# Patient Record
Sex: MALE | Race: WHITE | NOT HISPANIC OR LATINO
[De-identification: names, ages, dates, MRNs, and addresses within clinical notes are randomized per-mention and may not be internally consistent; named-entity substitution may affect disease eponyms.]

---

## 2017-02-01 ENCOUNTER — RX RENEWAL (OUTPATIENT)
Age: 72
End: 2017-02-01

## 2017-06-03 ENCOUNTER — EMERGENCY (EMERGENCY)
Facility: HOSPITAL | Age: 72
LOS: 1 days | Discharge: HOME | End: 2017-06-03
Payer: MEDICARE

## 2017-06-03 VITALS
RESPIRATION RATE: 18 BRPM | WEIGHT: 190.04 LBS | DIASTOLIC BLOOD PRESSURE: 79 MMHG | SYSTOLIC BLOOD PRESSURE: 145 MMHG | OXYGEN SATURATION: 96 % | TEMPERATURE: 98 F | HEART RATE: 86 BPM | HEIGHT: 70 IN

## 2017-06-03 PROCEDURE — 99283 EMERGENCY DEPT VISIT LOW MDM: CPT

## 2017-06-03 PROCEDURE — 99283 EMERGENCY DEPT VISIT LOW MDM: CPT | Mod: 25

## 2017-06-03 RX ADMIN — Medication 200 MILLIGRAM(S): at 20:31

## 2017-06-03 NOTE — ED PROVIDER NOTE - NS ED MD SCRIBE ATTENDING SCRIBE SECTIONS
REVIEW OF SYSTEMS/HISTORY OF PRESENT ILLNESS/PHYSICAL EXAM/PAST MEDICAL/SURGICAL/SOCIAL HISTORY/DISPOSITION/VITAL SIGNS( Pullset)

## 2017-06-03 NOTE — ED PROVIDER NOTE - MEDICAL DECISION MAKING DETAILS
engorged wood tick removed easily intact form right axilla with splinter forcep. Wound site cleansed with alcohol and bacitracin applied. Doxycyline 200mg given for prophylaxis.

## 2017-06-03 NOTE — ED PROVIDER NOTE - DETAILS:
Ministerio Nunes MD - The scribe's documentation has been prepared under my direction and personally reviewed by me in its entirety. I confirm that the note above accurately reflects all work, treatment, procedures, and medical decision making performed by me.

## 2017-06-03 NOTE — ED PROVIDER NOTE - OBJECTIVE STATEMENT
70 y/o M pt with history of bilateral knee and hip replacement, appendectomy, tonsillectomy, cardiac stent presents to the ED c/o tick bite to right axilla. Pt states he first noticed it this morning, but is unsure when he it happened. Pt states he also pulled something small out of his left forearm earlier and is unsure if that was a tick too or not. Pt is on Plavix, Sertraline, Crestor, Famotidine, aspirin. Unknown last Tetanus. Denies fever, numbness. No further complaints at this time.   PCP: Dr. Chin 72 y/o M pt with history of bilateral knee and hip replacement, appendectomy, tonsillectomy, cardiac stent presents to the ED c/o tick bite to right axilla. Pt states he first noticed it this morning, but is unsure when he it happened. Pt states he also pulled a tick out of his left arm earlier today. Pt is on Plavix, Sertraline, Crestor, Famotidine, aspirin. Unknown last Tetanus. Denies fever, numbness. No further complaints at this time.   PCP: Dr. Chin

## 2018-01-10 ENCOUNTER — RX RENEWAL (OUTPATIENT)
Age: 73
End: 2018-01-10

## 2018-09-26 ENCOUNTER — RX RENEWAL (OUTPATIENT)
Age: 73
End: 2018-09-26

## 2019-04-10 ENCOUNTER — TRANSCRIPTION ENCOUNTER (OUTPATIENT)
Age: 74
End: 2019-04-10

## 2019-06-06 ENCOUNTER — RX RENEWAL (OUTPATIENT)
Age: 74
End: 2019-06-06

## 2020-02-03 ENCOUNTER — RX RENEWAL (OUTPATIENT)
Age: 75
End: 2020-02-03

## 2020-09-06 ENCOUNTER — TRANSCRIPTION ENCOUNTER (OUTPATIENT)
Age: 75
End: 2020-09-06

## 2020-10-12 ENCOUNTER — RX RENEWAL (OUTPATIENT)
Age: 75
End: 2020-10-12

## 2021-01-27 DIAGNOSIS — K92.1 MELENA: ICD-10-CM

## 2021-01-28 LAB
BASOPHILS # BLD AUTO: 0.06 K/UL
BASOPHILS NFR BLD AUTO: 0.8 %
EOSINOPHIL # BLD AUTO: 0.12 K/UL
EOSINOPHIL NFR BLD AUTO: 1.6 %
HCT VFR BLD CALC: 48.4 %
HGB BLD-MCNC: 15.8 G/DL
IMM GRANULOCYTES NFR BLD AUTO: 0.3 %
LYMPHOCYTES # BLD AUTO: 1.92 K/UL
LYMPHOCYTES NFR BLD AUTO: 25.7 %
MAN DIFF?: NORMAL
MCHC RBC-ENTMCNC: 31.3 PG
MCHC RBC-ENTMCNC: 32.6 GM/DL
MCV RBC AUTO: 95.8 FL
MONOCYTES # BLD AUTO: 0.78 K/UL
MONOCYTES NFR BLD AUTO: 10.5 %
NEUTROPHILS # BLD AUTO: 4.56 K/UL
NEUTROPHILS NFR BLD AUTO: 61.1 %
PLATELET # BLD AUTO: 197 K/UL
RBC # BLD: 5.05 M/UL
RBC # FLD: 12.7 %
WBC # FLD AUTO: 7.46 K/UL

## 2021-01-29 ENCOUNTER — NON-APPOINTMENT (OUTPATIENT)
Age: 76
End: 2021-01-29

## 2021-01-29 LAB — HEMOCCULT STL QL: NEGATIVE

## 2021-02-03 NOTE — ED ADULT NURSE NOTE - CAS DISCH BELONGINGS RETURNED
with Max verbal and visual cueing/50% of the time/25% of the time/able to follow single-step instructions Not applicable

## 2021-02-09 PROBLEM — A69.20 LYME DISEASE, UNSPECIFIED: Chronic | Status: ACTIVE | Noted: 2017-06-03

## 2021-02-10 ENCOUNTER — APPOINTMENT (OUTPATIENT)
Dept: GASTROENTEROLOGY | Facility: CLINIC | Age: 76
End: 2021-02-10
Payer: MEDICARE

## 2021-02-10 VITALS
BODY MASS INDEX: 28.2 KG/M2 | HEART RATE: 82 BPM | WEIGHT: 197 LBS | SYSTOLIC BLOOD PRESSURE: 130 MMHG | DIASTOLIC BLOOD PRESSURE: 74 MMHG | HEIGHT: 70 IN | RESPIRATION RATE: 16 BRPM

## 2021-02-10 DIAGNOSIS — Z12.11 ENCOUNTER FOR SCREENING FOR MALIGNANT NEOPLASM OF COLON: ICD-10-CM

## 2021-02-10 DIAGNOSIS — K92.1 MELENA: ICD-10-CM

## 2021-02-10 PROCEDURE — 99202 OFFICE O/P NEW SF 15 MIN: CPT

## 2021-02-10 RX ORDER — SODIUM SULFATE, POTASSIUM SULFATE, MAGNESIUM SULFATE 17.5; 3.13; 1.6 G/ML; G/ML; G/ML
17.5-3.13-1.6 SOLUTION, CONCENTRATE ORAL
Qty: 1 | Refills: 0 | Status: ACTIVE | COMMUNITY
Start: 2021-02-10 | End: 1900-01-01

## 2021-02-10 NOTE — ASSESSMENT
[FreeTextEntry1] : This is a 75-year-old man reporting 4 days of melenic stool several weeks ago associated with an upset stomach.  Currently he no longer has an upset stomach and has had been having brown stools.  Recent blood work showing no evidence of anemia.  I cannot completely rule out peptic ulcer disease.  I recommend an upper endoscopy.  I recommend a screening colonoscopy given that his colonoscopy over 5 years ago with fair colonic preparation.  I explained to him the risks, alternatives and benefits to both upper endoscopy and colonoscopy.  Risk including but not limited to bleeding, perforation, infection and adverse medication reaction.  Questions were answered.  He stated understanding.  He will discuss with his cardiologist about stopping Plavix 5 days prior to his planned procedure.  He will continue taking the baby aspirin daily.  He is to call me if he has questions or concerns.

## 2021-02-10 NOTE — PHYSICAL EXAM
[General Appearance - Alert] : alert [General Appearance - In No Acute Distress] : in no acute distress [Sclera] : the sclera and conjunctiva were normal [Outer Ear] : the ears and nose were normal in appearance [Auscultation Breath Sounds / Voice Sounds] : lungs were clear to auscultation bilaterally [Heart Rate And Rhythm] : heart rate was normal and rhythm regular [Heart Sounds] : normal S1 and S2 [Heart Sounds Gallop] : no gallops [Murmurs] : no murmurs [Heart Sounds Pericardial Friction Rub] : no pericardial rub [Edema] : there was no peripheral edema [Abdomen Soft] : soft [Bowel Sounds] : normal bowel sounds [Abdomen Tenderness] : non-tender [] : no hepato-splenomegaly [Abdomen Mass (___ Cm)] : no abdominal mass palpated [No Focal Deficits] : no focal deficits [Skin Color & Pigmentation] : normal skin color and pigmentation

## 2021-03-08 DIAGNOSIS — Z01.818 ENCOUNTER FOR OTHER PREPROCEDURAL EXAMINATION: ICD-10-CM

## 2021-03-09 ENCOUNTER — APPOINTMENT (OUTPATIENT)
Dept: DISASTER EMERGENCY | Facility: CLINIC | Age: 76
End: 2021-03-09

## 2021-03-10 LAB — SARS-COV-2 N GENE NPH QL NAA+PROBE: NOT DETECTED

## 2021-03-12 ENCOUNTER — OUTPATIENT (OUTPATIENT)
Dept: OUTPATIENT SERVICES | Facility: HOSPITAL | Age: 76
LOS: 1 days | End: 2021-03-12
Payer: MEDICARE

## 2021-03-12 ENCOUNTER — RESULT REVIEW (OUTPATIENT)
Age: 76
End: 2021-03-12

## 2021-03-12 ENCOUNTER — APPOINTMENT (OUTPATIENT)
Dept: GASTROENTEROLOGY | Facility: HOSPITAL | Age: 76
End: 2021-03-12

## 2021-03-12 VITALS
WEIGHT: 184.97 LBS | HEART RATE: 91 BPM | RESPIRATION RATE: 21 BRPM | DIASTOLIC BLOOD PRESSURE: 77 MMHG | HEIGHT: 70 IN | TEMPERATURE: 97 F | OXYGEN SATURATION: 94 % | SYSTOLIC BLOOD PRESSURE: 138 MMHG

## 2021-03-12 VITALS
OXYGEN SATURATION: 95 % | HEART RATE: 73 BPM | RESPIRATION RATE: 13 BRPM | DIASTOLIC BLOOD PRESSURE: 79 MMHG | SYSTOLIC BLOOD PRESSURE: 133 MMHG

## 2021-03-12 DIAGNOSIS — Z12.11 ENCOUNTER FOR SCREENING FOR MALIGNANT NEOPLASM OF COLON: ICD-10-CM

## 2021-03-12 DIAGNOSIS — Z98.890 OTHER SPECIFIED POSTPROCEDURAL STATES: Chronic | ICD-10-CM

## 2021-03-12 DIAGNOSIS — Z90.49 ACQUIRED ABSENCE OF OTHER SPECIFIED PARTS OF DIGESTIVE TRACT: Chronic | ICD-10-CM

## 2021-03-12 DIAGNOSIS — K92.1 MELENA: ICD-10-CM

## 2021-03-12 PROCEDURE — 45385 COLONOSCOPY W/LESION REMOVAL: CPT

## 2021-03-12 PROCEDURE — 45385 COLONOSCOPY W/LESION REMOVAL: CPT | Mod: GC

## 2021-03-12 PROCEDURE — 43235 EGD DIAGNOSTIC BRUSH WASH: CPT | Mod: GC

## 2021-03-12 PROCEDURE — 88305 TISSUE EXAM BY PATHOLOGIST: CPT | Mod: 26

## 2021-03-12 PROCEDURE — 43235 EGD DIAGNOSTIC BRUSH WASH: CPT

## 2021-03-12 PROCEDURE — 88305 TISSUE EXAM BY PATHOLOGIST: CPT

## 2021-03-12 RX ORDER — ROSUVASTATIN CALCIUM 5 MG/1
1 TABLET ORAL
Qty: 0 | Refills: 0 | DISCHARGE

## 2021-03-12 RX ORDER — ASPIRIN/CALCIUM CARB/MAGNESIUM 324 MG
81 TABLET ORAL
Qty: 0 | Refills: 0 | DISCHARGE

## 2021-03-12 RX ORDER — CLOPIDOGREL BISULFATE 75 MG/1
0 TABLET, FILM COATED ORAL
Qty: 0 | Refills: 0 | DISCHARGE

## 2021-03-12 RX ORDER — FAMOTIDINE 10 MG/ML
0 INJECTION INTRAVENOUS
Qty: 0 | Refills: 0 | DISCHARGE

## 2021-03-12 RX ORDER — SERTRALINE 25 MG/1
0 TABLET, FILM COATED ORAL
Qty: 0 | Refills: 0 | DISCHARGE

## 2021-03-12 RX ORDER — SODIUM CHLORIDE 9 MG/ML
500 INJECTION INTRAMUSCULAR; INTRAVENOUS; SUBCUTANEOUS
Refills: 0 | Status: COMPLETED | OUTPATIENT
Start: 2021-03-12 | End: 2021-03-12

## 2021-03-12 RX ADMIN — SODIUM CHLORIDE 30 MILLILITER(S): 9 INJECTION INTRAMUSCULAR; INTRAVENOUS; SUBCUTANEOUS at 10:53

## 2021-03-12 NOTE — ASU DISCHARGE PLAN (ADULT/PEDIATRIC) - PATIENT EDUCATION MATERIALS PROVIED
Provider pre-printed instructions given/Pre-printed instructions given for drain care/Pre-printed instructions given for nerve block/Implant card (specify)

## 2021-03-12 NOTE — PRE-ANESTHESIA EVALUATION ADULT - NSANTHOSAYNRD_GEN_A_CORE
No. ONELIA screening performed.  STOP BANG Legend: 0-2 = LOW Risk; 3-4 = INTERMEDIATE Risk; 5-8 = HIGH Risk

## 2021-03-12 NOTE — PRE PROCEDURE NOTE - PRE PROCEDURE EVALUATION
Attending Physician:             Cassy Collado               Procedure: upper endoscopy, colonoscopy    Indication for Procedure: GI bleed  ________________________________________________________  PAST MEDICAL & SURGICAL HISTORY:  Hyperchylomicronemia    Coronary artery disease with other form of angina pectoris    Lyme disease    History of appendectomy    H/O shoulder surgery    History of hip surgery    H/O knee surgery      ALLERGIES:  No Known Allergies    HOME MEDICATIONS:  ASA: 81 milligram(s)  once a day  Crestor 5 mg oral tablet: 1 tab(s) orally once a day  famotidine:   Plavix:   sertraline: orally once a day  vitamin D 2000: once a day    AICD/PPM: [ ] yes   [ ] no    PERTINENT LAB DATA:                      PHYSICAL EXAMINATION:    Height (cm): 177.8  Weight (kg): 83.9  BMI (kg/m2): 26.5  BSA (m2): 2.02T(C): 36.3  HR: 91  BP: 138/77  RR: 21  SpO2: 94%    Constitutional: NAD  HEENT: PERRLA, EOMI,    Neck:  No JVD  Respiratory: CTAB/L  Cardiovascular: S1 and S2  Gastrointestinal: BS+, soft, NT/ND  Extremities: No peripheral edema  Neurological: A/O x 3, no focal deficits  Psychiatric: Normal mood, normal affect  Skin: No rashes    ASA Class: I [ ]  II [ ]  III [ ]  IV [ ]    COMMENTS:    The patient is a suitable candidate for the planned procedure unless box checked [ ]  No, explain:

## 2021-03-12 NOTE — ASU PATIENT PROFILE, ADULT - PMH
Coronary artery disease with other form of angina pectoris    Hyperchylomicronemia    Lyme disease

## 2021-03-16 LAB — SURGICAL PATHOLOGY STUDY: SIGNIFICANT CHANGE UP

## 2021-03-23 ENCOUNTER — NON-APPOINTMENT (OUTPATIENT)
Age: 76
End: 2021-03-23

## 2022-03-28 ENCOUNTER — RX RENEWAL (OUTPATIENT)
Age: 77
End: 2022-03-28

## 2022-07-16 ENCOUNTER — NON-APPOINTMENT (OUTPATIENT)
Age: 77
End: 2022-07-16

## 2022-07-24 ENCOUNTER — NON-APPOINTMENT (OUTPATIENT)
Age: 77
End: 2022-07-24

## 2022-07-28 ENCOUNTER — NON-APPOINTMENT (OUTPATIENT)
Age: 77
End: 2022-07-28

## 2022-08-04 ENCOUNTER — NON-APPOINTMENT (OUTPATIENT)
Age: 77
End: 2022-08-04

## 2023-04-06 ENCOUNTER — RX RENEWAL (OUTPATIENT)
Age: 78
End: 2023-04-06

## 2024-01-29 NOTE — HISTORY OF PRESENT ILLNESS
This encounter was created in error - please disregard.  
[FreeTextEntry1] : This is a pleasant 75-year-old man with history of coronary artery disease on aspirin and Plavix status post cardiac stenting, hyperlipidemia, presenting with symptoms of melena.  He relates that several weeks ago he noticed tarry colored black stools, occurring 2-4 times a day, lasting 4 days.  He states that he had an upset stomach during this time.  No nausea or vomiting.  No rectal bleeding.  He had blood work performed showing no anemia.  Stool Hemoccult negative.  He has been having brown stool for the past week.  He is currently taking famotidine 20 mg twice a day for heartburn.  No weight loss.  He underwent a colonoscopy in 2014 with fair colonic preparation and that there was residual stool in the right side of the colon, diverticulosis otherwise normal exam.

## 2024-04-08 ENCOUNTER — RX RENEWAL (OUTPATIENT)
Age: 79
End: 2024-04-08

## 2024-05-29 ENCOUNTER — EMERGENCY (EMERGENCY)
Facility: HOSPITAL | Age: 79
LOS: 1 days | Discharge: HOME | End: 2024-05-29
Attending: EMERGENCY MEDICINE | Admitting: EMERGENCY MEDICINE
Payer: COMMERCIAL

## 2024-05-29 VITALS
HEIGHT: 70 IN | SYSTOLIC BLOOD PRESSURE: 163 MMHG | TEMPERATURE: 98 F | HEART RATE: 60 BPM | OXYGEN SATURATION: 96 % | WEIGHT: 169.98 LBS | DIASTOLIC BLOOD PRESSURE: 67 MMHG | RESPIRATION RATE: 18 BRPM

## 2024-05-29 VITALS
OXYGEN SATURATION: 97 % | HEART RATE: 57 BPM | DIASTOLIC BLOOD PRESSURE: 75 MMHG | RESPIRATION RATE: 18 BRPM | TEMPERATURE: 98 F | SYSTOLIC BLOOD PRESSURE: 144 MMHG

## 2024-05-29 DIAGNOSIS — Z98.890 OTHER SPECIFIED POSTPROCEDURAL STATES: Chronic | ICD-10-CM

## 2024-05-29 DIAGNOSIS — Z90.49 ACQUIRED ABSENCE OF OTHER SPECIFIED PARTS OF DIGESTIVE TRACT: Chronic | ICD-10-CM

## 2024-05-29 PROCEDURE — 72125 CT NECK SPINE W/O DYE: CPT | Mod: MC

## 2024-05-29 PROCEDURE — 24650 CLTX RDL HEAD/NCK FX WO MNPJ: CPT | Mod: 54,LT

## 2024-05-29 PROCEDURE — 73080 X-RAY EXAM OF ELBOW: CPT

## 2024-05-29 PROCEDURE — 99285 EMERGENCY DEPT VISIT HI MDM: CPT | Mod: 57

## 2024-05-29 PROCEDURE — 71046 X-RAY EXAM CHEST 2 VIEWS: CPT

## 2024-05-29 PROCEDURE — 73110 X-RAY EXAM OF WRIST: CPT | Mod: 26,LT

## 2024-05-29 PROCEDURE — 70450 CT HEAD/BRAIN W/O DYE: CPT | Mod: MC

## 2024-05-29 PROCEDURE — 29125 APPL SHORT ARM SPLINT STATIC: CPT | Mod: LT

## 2024-05-29 PROCEDURE — 72125 CT NECK SPINE W/O DYE: CPT | Mod: 26,MC

## 2024-05-29 PROCEDURE — 71046 X-RAY EXAM CHEST 2 VIEWS: CPT | Mod: 26

## 2024-05-29 PROCEDURE — 70450 CT HEAD/BRAIN W/O DYE: CPT | Mod: 26,MC

## 2024-05-29 PROCEDURE — 73080 X-RAY EXAM OF ELBOW: CPT | Mod: 26,LT

## 2024-05-29 PROCEDURE — 73130 X-RAY EXAM OF HAND: CPT

## 2024-05-29 PROCEDURE — 73130 X-RAY EXAM OF HAND: CPT | Mod: 26,LT

## 2024-05-29 PROCEDURE — 73110 X-RAY EXAM OF WRIST: CPT

## 2024-05-29 PROCEDURE — 99284 EMERGENCY DEPT VISIT MOD MDM: CPT | Mod: 25

## 2024-05-29 PROCEDURE — 26600 TREAT METACARPAL FRACTURE: CPT | Mod: 54,LT

## 2024-05-29 RX ORDER — ACETAMINOPHEN 500 MG
650 TABLET ORAL ONCE
Refills: 0 | Status: COMPLETED | OUTPATIENT
Start: 2024-05-29 | End: 2024-05-29

## 2024-05-29 RX ADMIN — Medication 650 MILLIGRAM(S): at 18:03

## 2024-05-29 RX ADMIN — Medication 650 MILLIGRAM(S): at 19:40
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
## 2024-05-29 NOTE — ED PROVIDER NOTE - NSICDXPASTMEDICALHX_GEN_ALL_CORE_FT
PAST MEDICAL HISTORY:  Coronary artery disease with other form of angina pectoris     Hyperchylomicronemia     Lyme disease

## 2024-05-29 NOTE — ED PROVIDER NOTE - CARE PROVIDER_API CALL
Garrick Duval  Orthopaedic Surgery  [address], [address]  [place] [address]  Phone: [phone]  Fax: [phone]  Follow Up Time: 1-3 Days

## 2024-05-29 NOTE — ED PROVIDER NOTE - DIFFERENTIAL DIAGNOSIS
Differential including but not limited to ICH fracture dislocation sprain contusion Differential Diagnosis
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
## 2024-05-29 NOTE — ED PROVIDER NOTE - PROGRESS NOTE DETAILS
Discussed with Dr. Akers (ortho attending) he requests thumb spica sling and discharged to follow-up with Dr. Duval as outpatient

## 2024-05-29 NOTE — ED PROVIDER NOTE - NSICDXPASTSURGICALHX_GEN_ALL_CORE_FT
PAST SURGICAL HISTORY:  H/O knee surgery     H/O shoulder surgery     History of appendectomy     History of hip surgery
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
## 2024-05-29 NOTE — ED ADULT TRIAGE NOTE - INTERNATIONAL TRAVEL
What Is The Reason For Today's Visit?: Full Body Skin Examination What Is The Reason For Today's Visit? (Being Monitored For X): the development of a new lesion No
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
## 2024-05-29 NOTE — ED PROVIDER NOTE - PATIENT PORTAL LINK FT
You can access the FollowMyHealth Patient Portal offered by [facility] by registering at the following website: [email or web address]. By joining [employer]s FollowMyHealth portal, you will also be able to view your health information using other applications (apps) compatible with our system.

## 2024-05-30 PROBLEM — I25.118 ATHEROSCLEROTIC HEART DISEASE OF NATIVE CORONARY ARTERY WITH OTHER FORMS OF ANGINA PECTORIS: Chronic | Status: ACTIVE | Noted: 2021-03-12

## 2024-05-30 PROBLEM — E78.3 HYPERCHYLOMICRONEMIA: Chronic | Status: ACTIVE | Noted: 2021-03-12

## 2024-06-03 ENCOUNTER — NON-APPOINTMENT (OUTPATIENT)
Age: 79
End: 2024-06-03

## 2024-06-03 ENCOUNTER — APPOINTMENT (OUTPATIENT)
Dept: ORTHOPEDIC SURGERY | Facility: CLINIC | Age: 79
End: 2024-06-03
Payer: COMMERCIAL

## 2024-06-03 VITALS — HEIGHT: 70 IN | BODY MASS INDEX: 24.34 KG/M2 | WEIGHT: 170 LBS

## 2024-06-03 PROCEDURE — 29075 APPL CST ELBW FNGR SHORT ARM: CPT | Mod: LT

## 2024-06-03 PROCEDURE — 73080 X-RAY EXAM OF ELBOW: CPT | Mod: LT

## 2024-06-03 PROCEDURE — 73130 X-RAY EXAM OF HAND: CPT | Mod: LT

## 2024-06-03 PROCEDURE — 99203 OFFICE O/P NEW LOW 30 MIN: CPT | Mod: 25

## 2024-06-03 NOTE — ADDENDUM
[FreeTextEntry1] :  I, Maria Alejandra Iqbal wrote this note acting as a scribe for Dr. Garrick Duval on Jun 03, 2024.

## 2024-06-03 NOTE — DISCUSSION/SUMMARY
[FreeTextEntry1] :  The underlying pathophysiology was reviewed with the patient. XR films were reviewed with the patient. Discussed at length the nature of the patient's condition.   Patient was advised to take OTC medications and topical analgesic for pain management.  Treatment options were discussed including: observation, NSAIDS, and cast immobilization.  A short arm cast was applied. Cast care instructions were reviewed. Gentle ROM exercises in the cast were advised.  NSAIDS as tolerated.   He was informed that the radial head fracture will heal on its own.   All questions answered, understanding verbalized. Patient in agreement with plan of care.   Patient was advised to follow up in 3 weeks for repeat x-rays

## 2024-06-03 NOTE — END OF VISIT
[FreeTextEntry3] :  All medical record entries made by the Scribe were at my,  Dr. Garrick Duval MD., direction and personally dictated by me on 06/03/2024. I have personally reviewed the chart and agree that the record accurately reflects my personal performance of the history, physical exam, assessment and plan.

## 2024-06-03 NOTE — PHYSICAL EXAM
[de-identified] :  Patient is WDWN, alert, and in no acute distress. Breathing is unlabored. He is grossly oriented to person, place, and time.  Left Wrist: tenderness is present edema is present limited ROM  sensation is intact  Left Elbow: fracture of the radial healed tenderness is present edema is present limited ROM  sensation is intact [de-identified] :  AP, lateral and oblique views of the left hand were obtained today and revealed thumb metacarpal base fracture and CMC arthritis.   AP, lateral and oblique views of the left elbow were obtained today and revealed left radial head fracture.   ACC: [id number] EXAM: XR HAND MIN 3 VIEWS LT ORDERED BY: IVAN HERNANDEZ  ACC: [id number] EXAM: XR WRIST COMP MIN 3 VIEWS LT ORDERED BY: IVAN HERNANDEZ  PROCEDURE DATE: 05/29/2024  INTERPRETATION: Radiographs of the LEFT wrist and LEFT hand  CLINICAL INFORMATION: Injury with Pain.  TECHNIQUE: Frontal, oblique and lateral views of the hand.  FINDINGS: No prior examinations are available for review. First metacarpal bone base intra-articular oblique fracture of. Osteoarthritic sclerotic narrowing of the radiocarpal joint space. Carpal bone periarticular erosions of. Remaining osseous and joint structures radiographically intact IMPRESSION: First metacarpal bone base intra-articular oblique fracture. LEFT wrist osteoarthritis.  --- End of Report ---   ACC: [id number] EXAM: XR ELBOW COMP MIN 3V LT ORDERED BY: IVAN HERNANDEZ  PROCEDURE DATE: 05/29/2024    INTERPRETATION: Radiographs of the LEFT elbow  CLINICAL INFORMATION: Injury with Pain.  TECHNIQUE: Frontal, oblique and lateral views of the elbow.  FINDINGS: No prior examinations are available for review. LEFT radial head intra-articular oblique fracture . No other fracture. Joint effusion with elevation of anterior fat pad.  Periarticular dystrophic calcifications which may indicate CPPD disease.  IMPRESSION: Radial head oblique intra-articular fracture with joint effusion...  --- End of Report ---
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
## 2024-06-24 ENCOUNTER — APPOINTMENT (OUTPATIENT)
Dept: ORTHOPEDIC SURGERY | Facility: CLINIC | Age: 79
End: 2024-06-24
Payer: COMMERCIAL

## 2024-06-24 VITALS — WEIGHT: 170 LBS | BODY MASS INDEX: 24.34 KG/M2 | HEIGHT: 70 IN

## 2024-06-24 DIAGNOSIS — S42.402A UNSPECIFIED FRACTURE OF LOWER END OF LEFT HUMERUS, INITIAL ENCOUNTER FOR CLOSED FRACTURE: ICD-10-CM

## 2024-06-24 DIAGNOSIS — S62.509A FRACTURE OF UNSPECIFIED PHALANX OF UNSPECIFIED THUMB, INITIAL ENCOUNTER FOR CLOSED FRACTURE: ICD-10-CM

## 2024-06-24 PROCEDURE — 99213 OFFICE O/P EST LOW 20 MIN: CPT

## 2024-06-24 PROCEDURE — 73080 X-RAY EXAM OF ELBOW: CPT | Mod: LT

## 2024-06-24 PROCEDURE — 73130 X-RAY EXAM OF HAND: CPT | Mod: LT

## 2024-08-05 ENCOUNTER — APPOINTMENT (OUTPATIENT)
Dept: ORTHOPEDIC SURGERY | Facility: CLINIC | Age: 79
End: 2024-08-05

## 2024-08-05 PROCEDURE — 99203 OFFICE O/P NEW LOW 30 MIN: CPT

## 2024-08-05 PROCEDURE — 73080 X-RAY EXAM OF ELBOW: CPT | Mod: LT

## 2024-08-05 PROCEDURE — 73130 X-RAY EXAM OF HAND: CPT | Mod: LT

## 2024-08-05 NOTE — REASON FOR VISIT
[Follow-Up Visit] : a follow-up visit for [FreeTextEntry2] :  left thumb fracture and left elbow fracture.

## 2024-08-05 NOTE — PHYSICAL EXAM
[de-identified] :  Patient is WDWN, alert, and in no acute distress. Breathing is unlabored. He is grossly oriented to person, place, and time.  Left Wrist: tenderness is present edema is present limited ROM  sensation is intact  Left Elbow: fracture of the radial healed tenderness is present edema is present limited ROM 20 degrees flexion  to 40 degrees extension , full rotation sensation is intact [de-identified] :  AP, lateral and oblique views of the left hand were obtained today and revealed thumb metacarpal base fracture and CMC arthritis.   AP, lateral and oblique views of the left elbow were obtained today and revealed left radial head fracture.

## 2024-08-05 NOTE — PHYSICAL EXAM
[de-identified] :  Patient is WDWN, alert, and in no acute distress. Breathing is unlabored. He is grossly oriented to person, place, and time.  Left Wrist: tenderness is present edema is present limited ROM  sensation is intact  Left Elbow: fracture of the radial healed tenderness is present edema is present limited ROM 20 degrees flexion  to 40 degrees extension , full rotation sensation is intact [de-identified] :  AP, lateral and oblique views of the left hand were obtained today and revealed thumb metacarpal base fracture and CMC arthritis.   AP, lateral and oblique views of the left elbow were obtained today and revealed left radial head fracture.

## 2024-08-05 NOTE — ADDENDUM
[FreeTextEntry1] : I, Jer Lovelace Jr, acted solely as a scribe for Dr. Garrick Duval on this date 08/05/2024  All medical record entries made by the Scribe were at my, Dr. Garrick Duval, direction and personally dictated by me on 08/05/2024 . I have reviewed the chart and agree that the record accurately reflects my personal performance of the history, physical exam, assessment and plan. I have also personally directed, reviewed, and agreed with the chart.

## 2024-08-05 NOTE — DISCUSSION/SUMMARY
[FreeTextEntry1] :  The underlying pathophysiology was reviewed with the patient. XR films were reviewed with the patient. Discussed at length the nature of the patient's condition.   Patient was advised to take OTC medications and topical analgesic for pain management.  I highly recommended that the patient starts a course of physical therapy at this time. I provided the patient with a detailed prescription for physical therapy for left elbow ROM & strengthening.   Patient was informed that the fracture is fully healed, and he may return to his baseline tasks gradually with no restrictions.   All questions answered, understanding verbalized. Patient in agreement with plan of care.   Patient was advised to follow up as needed.
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
## 2024-10-02 ENCOUNTER — NON-APPOINTMENT (OUTPATIENT)
Age: 79
End: 2024-10-02

## 2024-10-02 DIAGNOSIS — Z87.442 PERSONAL HISTORY OF URINARY CALCULI: ICD-10-CM

## 2025-04-02 ENCOUNTER — RX RENEWAL (OUTPATIENT)
Age: 80
End: 2025-04-02

## 2025-05-23 ENCOUNTER — NON-APPOINTMENT (OUTPATIENT)
Age: 80
End: 2025-05-23

## 2025-05-24 ENCOUNTER — EMERGENCY (EMERGENCY)
Facility: HOSPITAL | Age: 80
LOS: 1 days | End: 2025-05-24
Attending: EMERGENCY MEDICINE | Admitting: EMERGENCY MEDICINE
Payer: MEDICARE

## 2025-05-24 VITALS
HEART RATE: 72 BPM | HEIGHT: 70 IN | DIASTOLIC BLOOD PRESSURE: 60 MMHG | WEIGHT: 175.05 LBS | SYSTOLIC BLOOD PRESSURE: 140 MMHG | TEMPERATURE: 98 F | OXYGEN SATURATION: 98 % | RESPIRATION RATE: 14 BRPM

## 2025-05-24 VITALS
SYSTOLIC BLOOD PRESSURE: 135 MMHG | DIASTOLIC BLOOD PRESSURE: 66 MMHG | HEART RATE: 76 BPM | RESPIRATION RATE: 18 BRPM | OXYGEN SATURATION: 98 % | TEMPERATURE: 98 F

## 2025-05-24 DIAGNOSIS — Z90.49 ACQUIRED ABSENCE OF OTHER SPECIFIED PARTS OF DIGESTIVE TRACT: Chronic | ICD-10-CM

## 2025-05-24 DIAGNOSIS — Z98.890 OTHER SPECIFIED POSTPROCEDURAL STATES: Chronic | ICD-10-CM

## 2025-05-24 LAB
ALBUMIN SERPL ELPH-MCNC: 3.1 G/DL — LOW (ref 3.3–5)
ALP SERPL-CCNC: 82 U/L — SIGNIFICANT CHANGE UP (ref 30–120)
ALT FLD-CCNC: 25 U/L — SIGNIFICANT CHANGE UP (ref 10–60)
ANION GAP SERPL CALC-SCNC: 12 MMOL/L — SIGNIFICANT CHANGE UP (ref 5–17)
APPEARANCE UR: CLEAR — SIGNIFICANT CHANGE UP
AST SERPL-CCNC: 21 U/L — SIGNIFICANT CHANGE UP (ref 10–40)
BASOPHILS # BLD AUTO: 0.06 K/UL — SIGNIFICANT CHANGE UP (ref 0–0.2)
BASOPHILS NFR BLD AUTO: 1.1 % — SIGNIFICANT CHANGE UP (ref 0–2)
BILIRUB SERPL-MCNC: 0.3 MG/DL — SIGNIFICANT CHANGE UP (ref 0.2–1.2)
BILIRUB UR-MCNC: NEGATIVE — SIGNIFICANT CHANGE UP
BUN SERPL-MCNC: 20 MG/DL — SIGNIFICANT CHANGE UP (ref 7–23)
CALCIUM SERPL-MCNC: 9.4 MG/DL — SIGNIFICANT CHANGE UP (ref 8.4–10.5)
CHLORIDE SERPL-SCNC: 106 MMOL/L — SIGNIFICANT CHANGE UP (ref 96–108)
CO2 SERPL-SCNC: 23 MMOL/L — SIGNIFICANT CHANGE UP (ref 22–31)
COLOR SPEC: YELLOW — SIGNIFICANT CHANGE UP
CREAT SERPL-MCNC: 1.1 MG/DL — SIGNIFICANT CHANGE UP (ref 0.5–1.3)
DIFF PNL FLD: ABNORMAL
EGFR: 67 ML/MIN/1.73M2 — SIGNIFICANT CHANGE UP
EGFR: 67 ML/MIN/1.73M2 — SIGNIFICANT CHANGE UP
EOSINOPHIL # BLD AUTO: 0.18 K/UL — SIGNIFICANT CHANGE UP (ref 0–0.5)
EOSINOPHIL NFR BLD AUTO: 3.2 % — SIGNIFICANT CHANGE UP (ref 0–6)
GLUCOSE SERPL-MCNC: 107 MG/DL — HIGH (ref 70–99)
GLUCOSE UR QL: NEGATIVE MG/DL — SIGNIFICANT CHANGE UP
HCT VFR BLD CALC: 41.5 % — SIGNIFICANT CHANGE UP (ref 39–50)
HGB BLD-MCNC: 14 G/DL — SIGNIFICANT CHANGE UP (ref 13–17)
IMM GRANULOCYTES NFR BLD AUTO: 0.4 % — SIGNIFICANT CHANGE UP (ref 0–0.9)
KETONES UR QL: NEGATIVE MG/DL — SIGNIFICANT CHANGE UP
LACTATE SERPL-SCNC: 1.8 MMOL/L — SIGNIFICANT CHANGE UP (ref 0.7–2)
LEUKOCYTE ESTERASE UR-ACNC: NEGATIVE — SIGNIFICANT CHANGE UP
LIDOCAIN IGE QN: 22 U/L — SIGNIFICANT CHANGE UP (ref 16–77)
LYMPHOCYTES # BLD AUTO: 1.05 K/UL — SIGNIFICANT CHANGE UP (ref 1–3.3)
LYMPHOCYTES # BLD AUTO: 18.6 % — SIGNIFICANT CHANGE UP (ref 13–44)
MCHC RBC-ENTMCNC: 32.3 PG — SIGNIFICANT CHANGE UP (ref 27–34)
MCHC RBC-ENTMCNC: 33.7 G/DL — SIGNIFICANT CHANGE UP (ref 32–36)
MCV RBC AUTO: 95.6 FL — SIGNIFICANT CHANGE UP (ref 80–100)
MONOCYTES # BLD AUTO: 0.55 K/UL — SIGNIFICANT CHANGE UP (ref 0–0.9)
MONOCYTES NFR BLD AUTO: 9.7 % — SIGNIFICANT CHANGE UP (ref 2–14)
NEUTROPHILS # BLD AUTO: 3.8 K/UL — SIGNIFICANT CHANGE UP (ref 1.8–7.4)
NEUTROPHILS NFR BLD AUTO: 67 % — SIGNIFICANT CHANGE UP (ref 43–77)
NITRITE UR-MCNC: NEGATIVE — SIGNIFICANT CHANGE UP
NRBC BLD AUTO-RTO: 0 /100 WBCS — SIGNIFICANT CHANGE UP (ref 0–0)
PH UR: 5.5 — SIGNIFICANT CHANGE UP (ref 5–8)
PLATELET # BLD AUTO: 188 K/UL — SIGNIFICANT CHANGE UP (ref 150–400)
POTASSIUM SERPL-MCNC: 4.2 MMOL/L — SIGNIFICANT CHANGE UP (ref 3.5–5.3)
POTASSIUM SERPL-SCNC: 4.2 MMOL/L — SIGNIFICANT CHANGE UP (ref 3.5–5.3)
PROT SERPL-MCNC: 7.2 G/DL — SIGNIFICANT CHANGE UP (ref 6–8.3)
PROT UR-MCNC: NEGATIVE MG/DL — SIGNIFICANT CHANGE UP
RBC # BLD: 4.34 M/UL — SIGNIFICANT CHANGE UP (ref 4.2–5.8)
RBC # FLD: 12.6 % — SIGNIFICANT CHANGE UP (ref 10.3–14.5)
SODIUM SERPL-SCNC: 141 MMOL/L — SIGNIFICANT CHANGE UP (ref 135–145)
SP GR SPEC: 1.02 — SIGNIFICANT CHANGE UP (ref 1–1.03)
TROPONIN I, HIGH SENSITIVITY RESULT: 13.4 NG/L — SIGNIFICANT CHANGE UP
UROBILINOGEN FLD QL: 1 MG/DL — SIGNIFICANT CHANGE UP (ref 0.2–1)
WBC # BLD: 5.66 K/UL — SIGNIFICANT CHANGE UP (ref 3.8–10.5)
WBC # FLD AUTO: 5.66 K/UL — SIGNIFICANT CHANGE UP (ref 3.8–10.5)

## 2025-05-24 PROCEDURE — 93010 ELECTROCARDIOGRAM REPORT: CPT

## 2025-05-24 PROCEDURE — 99285 EMERGENCY DEPT VISIT HI MDM: CPT

## 2025-05-24 PROCEDURE — 74177 CT ABD & PELVIS W/CONTRAST: CPT | Mod: 26

## 2025-05-24 RX ORDER — AMOXICILLIN AND CLAVULANATE POTASSIUM 500; 125 MG/1; MG/1
1 TABLET, FILM COATED ORAL
Qty: 20 | Refills: 0
Start: 2025-05-24 | End: 2025-06-02

## 2025-05-24 RX ORDER — AMOXICILLIN AND CLAVULANATE POTASSIUM 500; 125 MG/1; MG/1
1 TABLET, FILM COATED ORAL ONCE
Refills: 0 | Status: COMPLETED | OUTPATIENT
Start: 2025-05-24 | End: 2025-05-24

## 2025-05-24 RX ORDER — LACTOBACILLUS ACIDOPHILUS/PECT 75 MM-100
2 CAPSULE ORAL
Qty: 40 | Refills: 0
Start: 2025-05-24 | End: 2025-06-02

## 2025-05-24 RX ADMIN — Medication 1000 MILLILITER(S): at 15:33

## 2025-05-24 RX ADMIN — AMOXICILLIN AND CLAVULANATE POTASSIUM 1 TABLET(S): 500; 125 TABLET, FILM COATED ORAL at 15:48

## 2025-05-24 RX ADMIN — Medication 1000 MILLILITER(S): at 13:55

## 2025-05-24 NOTE — ED PROVIDER NOTE - CARE PROVIDER_API CALL
Carolina Ellis  Internal Medicine  [address], [address]  [place] [address]  Phone: [phone]  Fax: [phone]  Follow Up Time:     Mulugeta Schneider  Gastroenterology  [address] [place] [address]  Phone: [phone]  Fax: [phone]  Follow Up Time:

## 2025-05-24 NOTE — ED PROVIDER NOTE - PROGRESS NOTE DETAILS
Patient doing well, no acute complaints at this time.  Patient feels much improved.  Discussed with patient regarding the results of the CT, lab values.  Patient will follow-up with his primary care doctor as well as gastroenterology.  Discussed abdominal pain precautions and instructions, importance of close prompt follow-up, and to return with any acute changes or concerns.

## 2025-05-24 NOTE — ED ADULT NURSE NOTE - OBJECTIVE STATEMENT
Pt comes in complaining of lower abdominal pain x a couple of days. Pt endorses that the pain is localized more in the lower left side then the lower left. Hx of Appendectomy. Pt denies nausea, vomiting, diarrhea, chest pain, SOB.

## 2025-05-24 NOTE — ED PROVIDER NOTE - OBJECTIVE STATEMENT
79-year-old male with a history of hypertension, high cholesterol, depression, CAD status post stents, status post appendectomy presents with has been having some left lower quadrant abdominal pain over the past 1 week.  Normal p.o. intake.  Patient occasionally feeling some slight lightheadedness, but not at the current moment.  No acute headache.  No vertigo.  No chest pain or shortness of breath.  No dysuria/hematuria.  No cough/URI.  No recent trauma.  No recent travel or immobilization.  No recent illness.  No aggravating or alleviating factors otherwise noted.  No other acute complaints.  Patient was seen in urgent care today, sent to the ED for CAT scan.

## 2025-05-24 NOTE — ED ADULT NURSE REASSESSMENT NOTE - NS ED NURSE REASSESS COMMENT FT1
Pt walked to bathroom independently. Urine sample obtained. Pt continues to deny pain med necessity. Educated on plan of care. Verbalizes understanding of plan. Safety maintained. Remains A&Ox4.

## 2025-05-24 NOTE — ED PROVIDER NOTE - CLINICAL SUMMARY MEDICAL DECISION MAKING FREE TEXT BOX
acute left lower quadrant abdominal pain over the past 1 week.  No fever.  Slight lightheadedness, without any neurologic findings on exam.  Will check labs, CT abdomen/pelvis, IV fluids, reeval

## 2025-05-24 NOTE — ED PROVIDER NOTE - PATIENT PORTAL LINK FT
You can access the FollowMyHealth Patient Portal offered by [facility] by registering at the following website: [email or web address]. By joining [employer]’s FollowMyHealth portal, you will also be able to view your health information using other applications (apps) compatible with our system.

## 2025-05-24 NOTE — ED PROVIDER NOTE - CARE PROVIDERS DIRECT ADDRESSES
[email or web address]

## 2025-05-24 NOTE — ED PROVIDER NOTE - NSFOLLOWUPINSTRUCTIONS_ED_ALL_ED_FT
1. Follow-up with your Primary Medical doctor. Call  next business day for prompt follow-up.  2. Return to Emergency room for any worsening or persistent pain, weakness, fever, vomiting, diarrhea, unable to eat / drink, weak or dizzy, or any other concerning symptoms.  3. See attached instruction sheets for additional information, including information regarding signs and symptoms to look out for, reasons to seek immediate care and other important instructions.  4. Follow-up with your gastroenterologist with your upcoming appointment, you can call on Monday or Tuesday to get a sooner appointment  You can also follow-up with the referred gastroenterologist  5.  Augmentin twice daily for 10 days  6.  Lactobacillus twice daily for 10 days

## 2025-05-24 NOTE — ED PROVIDER NOTE - NSICDXPASTSURGICALHX_GEN_ALL_CORE_FT
----- Message from Yesi Sharma RN sent at 4/26/2023  2:47 PM CDT -----  Regarding: INR  Tong will have his INR obtained on Wednesday 5/10/23 at [facility]; this is a 2 week recheck.     
INR scheduled for 1:10 pm.  
Patient returned call to clinic. He confirms current Warfarin dosing and denies any missed/extra doses; medication or diet changes. No abnormal findings related to being on Warfarin or upcoming procedures/surgeries/dental work. Reviewed Warfarin dosing plan as outlined below. He verbalizes understanding and is in agreement with plan. Tong will have repeat INR in 4 weeks on Wednesday 6/07/23. Timed staff message created for this day.     Advised to contact the clinic at [phone] with the following:   Medication changes: New, dose change, advised to stop, especially antibiotics/steroids  Procedures planned: Surgical/dental/injections, or if you are advised to hold your Warfarin  Bruising/bleeding  ER visits/Urgent Care visits      
Tong's INR today is therapeutic at 2.3 on 52.5 mg of Warfarin   Reason for anticoagulation: atrial fibrillation  INR Range: 2.0-3.0  Last INR: 1.9 on 4/26/23    Call placed to Tong to discuss INR results and Warfarin dosing plan. No answer at home phone listed and voicemail not set up so unable to leave message. No other numbers listed for patient. Call placed to mobile number listed for spouse and voicemail left asking for return call to clinic. Advised if message received after clinic hours to take his usual 7.5mg (1 1/2 tablets) of Warfarin this evening and return call tomorrow morning.     If he reports no abnormal findings related to being on Warfarin. Reports no medication or dietary changes; and verifies correct previous Warfarin dosing.   Plan: Continue taking 7.5mg of Warfarin daily (52.5mg weekly)  Recheck INR in 4 weeks (6/07/23)    Onsite billing provider is Jerome Covarrubias MD.        
PAST SURGICAL HISTORY:  H/O knee surgery     H/O shoulder surgery     History of appendectomy     History of hip surgery

## 2025-05-24 NOTE — ED PROVIDER NOTE - GASTROINTESTINAL, MLM
Abdomen soft, Nondistended, positive tenderness in the left lower quadrant.  Minimal tenderness in the right lower quadrant.  No rebound or guarding.  No HSM.  No CVAT.  No upper abdominal tenderness.

## 2025-05-24 NOTE — ED PROVIDER NOTE - DIFFERENTIAL DIAGNOSIS
Rule out acute colitis, diverticulitis, electrolyte abnormality, leukocytosis, other acute pathology Differential Diagnosis

## 2025-05-25 PROCEDURE — 74177 CT ABD & PELVIS W/CONTRAST: CPT

## 2025-05-25 PROCEDURE — 83605 ASSAY OF LACTIC ACID: CPT

## 2025-05-25 PROCEDURE — 99285 EMERGENCY DEPT VISIT HI MDM: CPT | Mod: 25

## 2025-05-25 PROCEDURE — 84484 ASSAY OF TROPONIN QUANT: CPT

## 2025-05-25 PROCEDURE — 36415 COLL VENOUS BLD VENIPUNCTURE: CPT

## 2025-05-25 PROCEDURE — 93005 ELECTROCARDIOGRAM TRACING: CPT

## 2025-05-25 PROCEDURE — 80053 COMPREHEN METABOLIC PANEL: CPT

## 2025-05-25 PROCEDURE — 85025 COMPLETE CBC W/AUTO DIFF WBC: CPT

## 2025-05-25 PROCEDURE — 81001 URINALYSIS AUTO W/SCOPE: CPT

## 2025-05-25 PROCEDURE — 83690 ASSAY OF LIPASE: CPT

## 2025-06-27 ENCOUNTER — APPOINTMENT (OUTPATIENT)
Dept: GASTROENTEROLOGY | Facility: CLINIC | Age: 80
End: 2025-06-27